# Patient Record
Sex: FEMALE
[De-identification: names, ages, dates, MRNs, and addresses within clinical notes are randomized per-mention and may not be internally consistent; named-entity substitution may affect disease eponyms.]

---

## 2018-07-02 ENCOUNTER — HOSPITAL ENCOUNTER (OUTPATIENT)
Dept: HOSPITAL 92 - L&D/OP | Age: 23
Discharge: HOME | End: 2018-07-02
Attending: OBSTETRICS & GYNECOLOGY
Payer: COMMERCIAL

## 2018-07-02 VITALS — TEMPERATURE: 98.9 F | DIASTOLIC BLOOD PRESSURE: 99 MMHG | SYSTOLIC BLOOD PRESSURE: 169 MMHG

## 2018-07-02 VITALS — BODY MASS INDEX: 42.8 KG/M2

## 2018-07-02 DIAGNOSIS — O99.89: Primary | ICD-10-CM

## 2018-07-02 DIAGNOSIS — R03.0: ICD-10-CM

## 2018-07-02 DIAGNOSIS — Z3A.33: ICD-10-CM

## 2018-07-02 LAB
ALBUMIN SERPL BCG-MCNC: 3.4 G/DL (ref 3.5–5)
ALP SERPL-CCNC: 122 U/L (ref 40–150)
ALT SERPL W P-5'-P-CCNC: 23 U/L (ref 8–55)
ANION GAP SERPL CALC-SCNC: 12 MMOL/L (ref 10–20)
AST SERPL-CCNC: 19 U/L (ref 5–34)
BASOPHILS # BLD AUTO: 0.1 THOU/UL (ref 0–0.2)
BASOPHILS NFR BLD AUTO: 0.7 % (ref 0–1)
BILIRUB SERPL-MCNC: 0.2 MG/DL (ref 0.2–1.2)
BUN SERPL-MCNC: 5 MG/DL (ref 7–18.7)
CALCIUM SERPL-MCNC: 9.7 MG/DL (ref 7.8–10.44)
CHLORIDE SERPL-SCNC: 106 MMOL/L (ref 98–107)
CO2 SERPL-SCNC: 23 MMOL/L (ref 22–29)
CREAT CL PREDICTED SERPL C-G-VRATE: 218 ML/MIN (ref 70–130)
EOSINOPHIL # BLD AUTO: 0.1 THOU/UL (ref 0–0.7)
EOSINOPHIL NFR BLD AUTO: 1.1 % (ref 0–10)
GLOBULIN SER CALC-MCNC: 3.4 G/DL (ref 2.4–3.5)
GLUCOSE SERPL-MCNC: 104 MG/DL (ref 70–105)
HGB BLD-MCNC: 12 G/DL (ref 12–16)
LYMPHOCYTES # BLD: 3 THOU/UL (ref 1.2–3.4)
LYMPHOCYTES NFR BLD AUTO: 27.7 % (ref 21–51)
MCH RBC QN AUTO: 29.6 PG (ref 27–31)
MCV RBC AUTO: 85.9 FL (ref 78–98)
MONOCYTES # BLD AUTO: 0.6 THOU/UL (ref 0.11–0.59)
MONOCYTES NFR BLD AUTO: 5.8 % (ref 0–10)
NEUTROPHILS # BLD AUTO: 7.1 THOU/UL (ref 1.4–6.5)
NEUTROPHILS NFR BLD AUTO: 64.8 % (ref 42–75)
PLATELET # BLD AUTO: 260 THOU/UL (ref 130–400)
POTASSIUM SERPL-SCNC: 4.4 MMOL/L (ref 3.5–5.1)
RBC # BLD AUTO: 4.05 MILL/UL (ref 4.2–5.4)
SODIUM SERPL-SCNC: 137 MMOL/L (ref 136–145)
SP GR UR STRIP: 1.01 (ref 1–1.04)
WBC # BLD AUTO: 10.9 THOU/UL (ref 4.8–10.8)

## 2018-07-02 PROCEDURE — 85025 COMPLETE CBC W/AUTO DIFF WBC: CPT

## 2018-07-02 PROCEDURE — 80053 COMPREHEN METABOLIC PANEL: CPT

## 2018-07-02 PROCEDURE — 81003 URINALYSIS AUTO W/O SCOPE: CPT

## 2018-07-02 PROCEDURE — 36415 COLL VENOUS BLD VENIPUNCTURE: CPT

## 2018-07-02 PROCEDURE — 99283 EMERGENCY DEPT VISIT LOW MDM: CPT

## 2018-07-02 NOTE — SS
OB TRIAGE NOTE

 

DATE OF EVALUATION:  07/02/2018

 

REGULAR PHYSICIAN:  Katy Washburn M.D.

 

EVALUATING PHYSICIAN:  Miguel Hernandez M.D.

 

CHIEF COMPLAINT:  Elevated blood pressures at home.

 

HISTORY OF PRESENT ILLNESS:  Ms. Otto is a 22-year-old , G1, P0, with an estimated date of c
onfinement of 08/19/2018, who presents complaining of swelling of her hands and feet over the last 24
 hours.  She denies visual changes, headaches or right upper quadrant pain.  She states that she had 
her father's blood pressure cuff at home and took her blood pressures there and they were elevated.  
Her prenatal care has been uncomplicated so far with Dr. Washburn.

 

PAST MEDICAL HISTORY:  Unremarkable.

 

PAST SURGICAL HISTORY:  None.

 

CURRENT MEDICATIONS:  Prenatal vitamins.

 

ALLERGIES:  No known allergies.

 

SOCIAL HISTORY:  Denies tobacco or alcohol use.

 

FAMILY HISTORY:  Denies gynecologic malignancy.

 

REVIEW OF SYSTEMS:  She denies headache, visual change, right upper quadrant pain, vaginal bleeding o
r leakage of fluid.

 

PHYSICAL EXAMINATION:

VITAL SIGNS:  Initial blood pressures here are 166/99 and 157/93.  It was repeated with a second cuff
 and was then 144/95.

ABDOMEN:  Soft, nontender and gravid.

PELVIC:  Pelvic examination is deferred.  Fetal heart tones are stable.  No decelerations are seen.  
No uterine contractions are seen.

 

LABORATORY DATA:  White count 10.9, hemoglobin and hematocrit 12.0 and 34.8, platelets are 260,000.  
Creatinine 0.6.  AST and ALT are 19 and 23 respectively.  On urinalysis specific gravity is 1.011, no
 protein, no glucose, no nitrites and no bilirubin are seen.

 

Blood pressures are repeated and most recent blood pressure is 138/84.

 

ASSESSMENT:

1.  A 33 and 1/7 week intrauterine pregnancy.

2.  No evidence of severe PIH at this time.

 

PLAN:  The patient will be discharged home to bed rest.  She is told to contact Dr. Washburn regarding
 her visit here today.  Signs and symptoms of preeclampsia have been reviewed with her in detail, and
 she should return to labor and delivery should she experience these.

## 2018-07-24 ENCOUNTER — HOSPITAL ENCOUNTER (INPATIENT)
Dept: HOSPITAL 92 - L&D/OP | Age: 23
LOS: 5 days | Discharge: HOME | End: 2018-07-29
Attending: OBSTETRICS & GYNECOLOGY | Admitting: OBSTETRICS & GYNECOLOGY
Payer: COMMERCIAL

## 2018-07-24 VITALS — BODY MASS INDEX: 43.4 KG/M2

## 2018-07-24 DIAGNOSIS — O61.0: ICD-10-CM

## 2018-07-24 DIAGNOSIS — Z3A.36: ICD-10-CM

## 2018-07-24 LAB
ALBUMIN SERPL BCG-MCNC: 3.4 G/DL (ref 3.5–5)
ALP SERPL-CCNC: 172 U/L (ref 40–150)
ALT SERPL W P-5'-P-CCNC: 26 U/L (ref 8–55)
ANION GAP SERPL CALC-SCNC: 12 MMOL/L (ref 10–20)
AST SERPL-CCNC: 23 U/L (ref 5–34)
BACTERIA UR QL AUTO: (no result) HPF
BASOPHILS # BLD AUTO: 0 THOU/UL (ref 0–0.2)
BASOPHILS NFR BLD AUTO: 0.2 % (ref 0–1)
BILIRUB SERPL-MCNC: 0.2 MG/DL (ref 0.2–1.2)
BUN SERPL-MCNC: 10 MG/DL (ref 7–18.7)
CALCIUM SERPL-MCNC: 8.9 MG/DL (ref 7.8–10.44)
CHLORIDE SERPL-SCNC: 106 MMOL/L (ref 98–107)
CO2 SERPL-SCNC: 20 MMOL/L (ref 22–29)
CREAT CL PREDICTED SERPL C-G-VRATE: 201 ML/MIN (ref 70–130)
CRYSTAL-AUWI FLAG: 0 (ref 0–15)
EOSINOPHIL # BLD AUTO: 0.1 THOU/UL (ref 0–0.7)
EOSINOPHIL NFR BLD AUTO: 0.8 % (ref 0–10)
GLOBULIN SER CALC-MCNC: 3.3 G/DL (ref 2.4–3.5)
GLUCOSE SERPL-MCNC: 85 MG/DL (ref 70–105)
HEV IGM SER QL: 4.6 (ref 0–7.99)
HGB BLD-MCNC: 12.1 G/DL (ref 12–16)
HYALINE CASTS #/AREA URNS LPF: (no result) LPF
LYMPHOCYTES # BLD: 3.2 THOU/UL (ref 1.2–3.4)
LYMPHOCYTES NFR BLD AUTO: 27.7 % (ref 21–51)
MCH RBC QN AUTO: 30.7 PG (ref 27–31)
MCV RBC AUTO: 85.7 FL (ref 78–98)
MONOCYTES # BLD AUTO: 0.7 THOU/UL (ref 0.11–0.59)
MONOCYTES NFR BLD AUTO: 6 % (ref 0–10)
NEUTROPHILS # BLD AUTO: 7.6 THOU/UL (ref 1.4–6.5)
NEUTROPHILS NFR BLD AUTO: 65.3 % (ref 42–75)
PATHC CAST-AUWI FLAG: 0.29 (ref 0–2.49)
PLATELET # BLD AUTO: 228 THOU/UL (ref 130–400)
POTASSIUM SERPL-SCNC: 3.9 MMOL/L (ref 3.5–5.1)
RBC # BLD AUTO: 3.93 MILL/UL (ref 4.2–5.4)
RBC UR QL AUTO: (no result) HPF (ref 0–3)
SODIUM SERPL-SCNC: 134 MMOL/L (ref 136–145)
SP GR UR STRIP: 1.01 (ref 1–1.04)
SPERM-AUWI FLAG: 0 (ref 0–9.9)
WBC # BLD AUTO: 11.6 THOU/UL (ref 4.8–10.8)
YEAST-AUWI FLAG: 0 (ref 0–25)

## 2018-07-24 PROCEDURE — 76815 OB US LIMITED FETUS(S): CPT

## 2018-07-24 PROCEDURE — 51702 INSERT TEMP BLADDER CATH: CPT

## 2018-07-24 PROCEDURE — 86901 BLOOD TYPING SEROLOGIC RH(D): CPT

## 2018-07-24 PROCEDURE — 36415 COLL VENOUS BLD VENIPUNCTURE: CPT

## 2018-07-24 PROCEDURE — 85027 COMPLETE CBC AUTOMATED: CPT

## 2018-07-24 PROCEDURE — 81003 URINALYSIS AUTO W/O SCOPE: CPT

## 2018-07-24 PROCEDURE — 86900 BLOOD TYPING SEROLOGIC ABO: CPT

## 2018-07-24 PROCEDURE — 86780 TREPONEMA PALLIDUM: CPT

## 2018-07-24 PROCEDURE — 87340 HEPATITIS B SURFACE AG IA: CPT

## 2018-07-24 PROCEDURE — 80053 COMPREHEN METABOLIC PANEL: CPT

## 2018-07-24 PROCEDURE — 99285 EMERGENCY DEPT VISIT HI MDM: CPT

## 2018-07-24 PROCEDURE — 86850 RBC ANTIBODY SCREEN: CPT

## 2018-07-24 PROCEDURE — 85025 COMPLETE CBC W/AUTO DIFF WBC: CPT

## 2018-07-24 PROCEDURE — 82805 BLOOD GASES W/O2 SATURATION: CPT

## 2018-07-24 PROCEDURE — 81015 MICROSCOPIC EXAM OF URINE: CPT

## 2018-07-24 RX ADMIN — MAGNESIUM SULFATE HEPTAHYDRATE SCH MLS: 40 INJECTION, SOLUTION INTRAVENOUS at 23:40

## 2018-07-24 NOTE — PDOC.LDHP
Labor and Delivery H&P


Chief complaint: other (c/o pressure)


HPI: 


23 yo LAF presents c/o vaginal pressure. Denies HA or visual changes.


Current gestational age (weeks): 36


Due date: 18


Dating criteria: last menstrual period


Grav: 1


Para: 0


OB History Details: 


PNC with Dr. Washburn.


Seen several weeks ago with elevated BP, labs were normal at that time.


Current pregnancy complications: none


Abnormal US findings: No


Past Medical History: 


None


Current medications: pre-milka vitamins


Previous surgical history: none


Allergies/Adverse Reactions: 


 Allergies











Allergy/AdvReac Type Severity Reaction Status Date / Time


 


No Known Drug Allergies Allergy   Verified 18 01:22











Social history: none





- Physical Exam


Abnormal vital signs: /106


General: NAD


Heart: RRR


Lungs: nonlabored breathing


Abdomen: gravid


Extremeties: pitting edema


FHT: category 1, variability present





- Vaginal Exam


cm dilated: 1


Effacement: 0%


Station: -2





- OB Labs


Blood type: unknown


RH: unknown


Antibody Screen: unknown


HIV: unknown


RPR: unknown


HEPSAg: unknown


1 hour GCT: unknown


GBS: unknown





- Assessment


36 and 2/7 weeks


BPs c/w severe PIH


Bedside USG confirms vtx, subjective decreased CHANDU and grade 3 placenta


Labs WNL


Unfavorable cervix





- Plan


-: 


Admit to L&D.


Start MgSO4 and observe.


Give steroids x2, 12 hrs apart.


Begin Cytotec for cervical ripening followed by Pitocin induction to follow.


Discussed with Dr. Washburn who agrees with plan.

## 2018-07-25 LAB
ANALYZER IN CARDIO: (no result)
BASE EXCESS STD BLDA CALC-SCNC: -11 MEQ/L
HBSAG INDEX: 0.22 S/CO (ref 0–0.99)
HCO3 BLDA-SCNC: 19 MEQ/L (ref 22–28)
SYPHILIS ANTIBODY INDEX: 0.05 S/CO

## 2018-07-25 RX ADMIN — MAGNESIUM SULFATE HEPTAHYDRATE SCH MLS: 40 INJECTION, SOLUTION INTRAVENOUS at 22:06

## 2018-07-25 RX ADMIN — MAGNESIUM SULFATE HEPTAHYDRATE SCH MLS: 40 INJECTION, SOLUTION INTRAVENOUS at 08:23

## 2018-07-25 NOTE — PDOC.LDPN
Labor & Delivery Progress Note





- Subjective


Subjective: comfortable





- Objective


Vital signs reviewed and normal: yes


General: NAD


Uterine fundus: non tender


Dilation: 4


Effacement: 90%


Station: -2


FHT: category 2, late decelerations (recurrent with > 50% of contractions, deep 

)


[place] contractions every: 2-5min





- Assessment


(1) 36 weeks gestation of pregnancy


Code(s): Z3A.36 - 36 WEEKS GESTATION OF PREGNANCY   Current Visit: Yes   Status

: Acute   





(2) Severe preeclampsia


Code(s): O14.10 - SEVERE PRE-ECLAMPSIA, UNSPECIFIED TRIMESTER   Current Visit: 

Yes   Status: Acute   


Qualifiers: 


   Trimester: third trimester   Qualified Code(s): O14.13 - Severe pre-eclampsia

, third trimester   





(3) Non-reassuring electronic fetal monitoring tracing


Code(s): O76 - ABNLT IN FETAL HEART RATE AND RHYTHM COMP LABOR AND DELIVERY   

Current Visit: Yes   Status: Acute   


-: 





(late entry) At 1400 I was called by JILLIAN Ritchie for NRFHT, prev approx 20min 

earlier had just given 2nd dose of terbutaline for recurrent late decels and 

minimal variability, pt had received epidural that was working and maternal O2, 

IVF bolus and position change.  Late decelerations were getting worse and 

decision was made to proceed to OR with stat primary CS for NRFHT.

## 2018-07-25 NOTE — PDOC.LDPN
Labor & Delivery Progress Note





- Subjective


Subjective: painful contractions (8/10), other (no PIH sx.)





- Objective


Vital signs reviewed and normal: yes


Abnormal vital signs: mild range blood pressures


General: NAD


Uterine fundus: non tender


Dilation: 2


Effacement: 75%


Station: -2 (arom clear)


FHT: category 2, late decelerations


[place] contractions every: 3min


AROM: clear fluid


IUPC placed: yes


FSE placed: yes


Resuscitative measures: maternal oxygen, maternal IV fluids, maternal position 

change





- Assessment


(1) 36 weeks gestation of pregnancy


Code(s): Z3A.36 - 36 WEEKS GESTATION OF PREGNANCY   Current Visit: Yes   Status

: Acute   





(2) Severe preeclampsia


Code(s): O14.10 - SEVERE PRE-ECLAMPSIA, UNSPECIFIED TRIMESTER   Current Visit: 

Yes   Status: Acute   


Plan: pitocin for augmentation, resuscitative measures, other (cont mag for PEC 

and PCN for GBS, s/p BMZ x 2 doses.  Plan of care discussed with pt and family.)

## 2018-07-25 NOTE — PRG
DATE OF SERVICE:  2018

 

OB ER ENCOUNTER

 

The patient is a 22-year-old female who underwent a primary  for nonreassuring fetal heart t
ones.  Dr. Katy Washburn was primary surgeon and Dr. Kale Black, myself was first alton fernandez

## 2018-07-25 NOTE — PDOC.OPDEL
OB Operative/Delivery Note


Delivery Dr/Surgeon: Maddison


Assist: Sofia


Pre-Delivery Diagnosis: non-reassuring fetal tracing (, Severe PIH, 36wk 

gestation)


Procedure/Post Delivery Dx: primary low transverse CS (stat)


Weeks gestation: 36


Anesthesia: epidural





- Findings


  ** A


Sex: male


Weight: 6 lb 2 oz





- Additional Findings/Plan


Placenta delivered: spontaneous


 findings: low transverse hysterotomy without extension, normal uterus, 

normal tubes, normal ovaries


Estimated blood loss: normal, final pending


Post delivery plan: recovery in LICU (mag recovery)

## 2018-07-25 NOTE — OP
DATE OF OPERATION:  2018

 

PREOPERATIVE DIAGNOSES:

1.  Intrauterine pregnancy at 36 weeks and 3 days.

2.  Severe preeclampsia.

3.  Non-reassuring fetal heart tones.

 

POSTOPERATIVE DIAGNOSES:

1.  Intrauterine pregnancy at 36 weeks and 3 days.

2.  Severe preeclampsia.

3.  Non-reassuring fetal heart tones.

 

PROCEDURE:  Primary low transverse  section via Pfannenstiel skin incision.

 

ANESTHESIA:  Epidural.

 

ATTENDING SURGEON:  Katy Washburn M.D.

 

ASSISTANT:  Dr. Adam Black.

 

ESTIMATED BLOOD LOSS:  Minimal.  Final quantitative blood loss is pending.

 

INTRAVENOUS FLUIDS:  1100 mL crystalloid.

 

URINE OUTPUT:  42 of clear urine.

 

COMPLICATIONS:  None.

 

DRAINS:  Badillo catheter.

 

PATHOLOGY:  None.

 

FINDINGS:  Male infant, cephalic presentation, clear amniotic fluid, Apgars are pending, weight was 6
 pounds 2 ounces, body cord x1.  Normal uterus, ovaries and tubes bilaterally.  Hysterotomy without e
xtension.

 

OPERATIVE INDICATIONS:  A 22-year-old G1, P0 at 36 weeks and 2 days, presented with pelvic pressure w
as found to have severe range pressures, had workup for preeclampsia that included normal labs and no
 symptoms of preeclampsia.  She was dispositioned for induction secondary to severe features met by haroldo worley of blood pressure.  She was started on magnesium for persistent severe range pressures and gi
roberto 2 doses of Cytotec overnight approximately 3 hours.  After the second dose of Cytotec, the patien
t began having intermittent late deceleration.  She was resuscitated and this improved.  She had mode
rate variability and occasional acceleration.  She was ruptured and internals were placed for improve
d monitoring and the patient was marcy on her own every 2-3 minutes.  Shortly thereafter, the p
atient began having recurrent repetitive late decelerations with every contraction and she was resusc
itated and given a dose of terbutaline.  She then requested an epidural, which she was able to get, t
he fetal heart tones improved after the epidural and she began having late decelerations once again. 
 She was bolused.  Her blood pressure did not drop secondary to the epidural and was given a second d
ose of terbutaline.  This did not help her late deceleration and they began to come deeper with minim
al variability and decision at that time was made to proceed for staph .

 

OPERATIVE TECHNIQUE:  The patient was taken to the operating room where epidural anesthesia was found
 to be adequate.  The patient was prepped and draped in a sterile fashion in the dorsal supine positi
on with a leftward tilt.  After ensuring adequacy of anesthesia, a Pfannenstiel skin incision was mad
e and carried down to the underlying subcutaneous tissue with knife.  The fascia was nicked in the mi
dline with a knife and carried laterally with the Marquez scissors.  The superior aspect of the fascia w
as tented with 2 Kochers and dissected off the rectus with the Marquez scissors.  The inferior aspect of
 the fascia was tented with 2 Kochers and dissected off the rectus down the pubic symphysis.  The rec
tus were bluntly divided in midline.  The peritoneum was bluntly entered into and manually retracted.
  The Toño O retractor was placed in the lower uterine segment was incised in a transverse fashion 
and extended with the Whitaker maneuver.  The infant's head was brought to the hysterotomy and delivered 
with fundal pressure, the delayed cord clamping was performed and the infant's cord was clamped and i
nfant handed to awaiting serene team.  The placenta was allowed to spontaneously deliver.  The uterus wa
s exteriorized, cleared of all clots and debris and the posterior cul-de-sac was lapped out uterus.  
Placed back into the abdomen and hysterotomy was repaired with a #1 Monocryl in a running locking fas
hion.  A second horizontal imbricating layer was placed and excellent hemostasis was noted.  Irrigati
on was performed of the pelvis and suction.  The Toño O retractor was removed and the rectus muscle
s were examined and noted to be hemostatic.  The peritoneum was closed with a 2-0 chromic in a runnin
g fashion.  The fascia was repaired with #1 PDS x2 sutures with excellent reapproximation.  The subcu
taneous tissue was irrigated and cauterized of any bleeders and reapproximated with 2-0 plain gut in 
a running fashion.  The skin was closed with 4-0 Monocryl in a subcuticular fashion.  Dermabond was a
pplied.  The patient tolerated procedure well.  Sponge, lap, needle count correct x2.  The patient wa
s taken to recovery in stable condition.  Patient received Ancef 2 grams prior to procedure.

## 2018-07-25 NOTE — PDOC.EVN
Event Note





- Event Note


Event Note: 


BPs improved on MgSO4.


1st dose Ctotec and steroids given.


Stadol given per pt. request.


FHTs stable, irregular UCs seen.


Cont. present mgmt.

## 2018-07-26 RX ADMIN — MAGNESIUM SULFATE HEPTAHYDRATE SCH MLS: 40 INJECTION, SOLUTION INTRAVENOUS at 08:56

## 2018-07-26 RX ADMIN — HYDROCODONE BITARTRATE AND ACETAMINOPHEN PRN TAB: 5; 325 TABLET ORAL at 17:05

## 2018-07-26 RX ADMIN — DOCUSATE CALCIUM SCH: 240 CAPSULE, LIQUID FILLED ORAL at 17:17

## 2018-07-26 RX ADMIN — HYDROCODONE BITARTRATE AND ACETAMINOPHEN PRN TAB: 5; 325 TABLET ORAL at 08:45

## 2018-07-26 NOTE — PDOC.PP
Post Partum Progress Note


Post Partum Day #: 1


PO intake tolerated: yes


Flatus: no


Ambulation: no


 Vital Signs (12 hours)











  Temp Pulse Resp


 


 07/26/18 04:00  98.3 F  96  20








 Weight











Weight                         215 lb

















- Physical Examination


General: NAD


Cardiovascular: RRR


Respiratory: non-labored breathing


Abdominal: no distention, appropriately TTP


Fundus firm & at: umb


Extremities: negative homans (B)


Neurological: no gross focal deficits


Psychiatric: normal affect


Result Diagrams: 


 07/24/18 20:47





 07/24/18 20:47


Additional Labs: 


 Post Partum Labs











Blood Type  AB POSITIVE   07/24/18  23:48    


 


Hep Bs Antigen  Non-Reactive S/CO (NonReactive)   07/24/18  23:48    











(1) 36 weeks gestation of pregnancy


Code(s): Z3A.36 - 36 WEEKS GESTATION OF PREGNANCY   Status: Acute   





(2) Severe preeclampsia


Code(s): O14.10 - SEVERE PRE-ECLAMPSIA, UNSPECIFIED TRIMESTER   Status: Acute   


Qualifiers: 


   Trimester: third trimester   Qualified Code(s): O14.13 - Severe pre-eclampsia

, third trimester   





(3) Non-reassuring electronic fetal monitoring tracing


Code(s): O76 - ABNLT IN FETAL HEART RATE AND RHYTHM COMP LABOR AND DELIVERY   

Status: Acute

## 2018-07-27 LAB
HGB BLD-MCNC: 10.9 G/DL (ref 12–16)
MCH RBC QN AUTO: 29.4 PG (ref 27–31)
MCV RBC AUTO: 87.3 FL (ref 78–98)
PLATELET # BLD AUTO: 212 THOU/UL (ref 130–400)
RBC # BLD AUTO: 3.7 MILL/UL (ref 4.2–5.4)
WBC # BLD AUTO: 15.6 THOU/UL (ref 4.8–10.8)

## 2018-07-27 RX ADMIN — HYDROCODONE BITARTRATE AND ACETAMINOPHEN PRN TAB: 5; 325 TABLET ORAL at 18:27

## 2018-07-27 RX ADMIN — HYDROCODONE BITARTRATE AND ACETAMINOPHEN PRN TAB: 5; 325 TABLET ORAL at 23:02

## 2018-07-27 RX ADMIN — HYDROCODONE BITARTRATE AND ACETAMINOPHEN PRN TAB: 5; 325 TABLET ORAL at 06:36

## 2018-07-27 RX ADMIN — HYDROCODONE BITARTRATE AND ACETAMINOPHEN PRN TAB: 5; 325 TABLET ORAL at 11:22

## 2018-07-27 RX ADMIN — DOCUSATE CALCIUM SCH MG: 240 CAPSULE, LIQUID FILLED ORAL at 00:48

## 2018-07-27 RX ADMIN — DOCUSATE CALCIUM SCH MG: 240 CAPSULE, LIQUID FILLED ORAL at 07:07

## 2018-07-27 RX ADMIN — DOCUSATE CALCIUM SCH MG: 240 CAPSULE, LIQUID FILLED ORAL at 21:49

## 2018-07-27 RX ADMIN — HYDROCODONE BITARTRATE AND ACETAMINOPHEN PRN TAB: 5; 325 TABLET ORAL at 14:15

## 2018-07-27 RX ADMIN — HYDROCODONE BITARTRATE AND ACETAMINOPHEN PRN TAB: 5; 325 TABLET ORAL at 00:48

## 2018-07-27 NOTE — PDOC.PP
Post Partum Progress Note


Post Partum Day #: 2


PO intake tolerated: yes


Flatus: yes


Ambulation: yes


 Vital Signs (12 hours)











  Temp Pulse Resp BP


 


 07/27/18 00:00  98.0 F  92  20  141/85 H


 


 07/26/18 21:20  98.5 F  99  18  136/77








 Weight











Weight                         215 lb

















- Physical Examination


General: NAD


Cardiovascular: RRR


Respiratory: non-labored breathing


Abdominal: no distention, appropriately TTP


Fundus firm & at: umb


Extremities: negative homans (B)


Skin: CS incision dry & intact


Neurological: no gross focal deficits


Psychiatric: normal affect


Result Diagrams: 


 07/27/18 05:24





 07/24/18 20:47


Additional Labs: 


 Post Partum Labs











Blood Type  AB POSITIVE   07/24/18  23:48    


 


Hep Bs Antigen  Non-Reactive S/CO (NonReactive)   07/24/18  23:48    











(1) 36 weeks gestation of pregnancy


Code(s): Z3A.36 - 36 WEEKS GESTATION OF PREGNANCY   Status: Acute   





(2) Severe preeclampsia


Code(s): O14.10 - SEVERE PRE-ECLAMPSIA, UNSPECIFIED TRIMESTER   Status: Acute   


Qualifiers: 


   Trimester: third trimester   Qualified Code(s): O14.13 - Severe pre-eclampsia

, third trimester   





(3) Non-reassuring electronic fetal monitoring tracing


Code(s): O76 - ABNLT IN FETAL HEART RATE AND RHYTHM COMP LABOR AND DELIVERY   

Status: Acute   





- Assessment/Plan





POD2 s/p PCS for NRFHT at 36w 2/2 severe PIH


VSSAF


PEC- s/p mag, BP nl-mild, no sx PIH, nl labs, Will start BP meds if pressures 

persistently elevated


Mild asx anemia d/t surgical blood loss, cont PNV


Appropriate postop milestones, 


Breastpumping, infant in NICU


Rh pos RImm


Cont postop care, poss home tomorrow

## 2018-07-28 LAB
HGB BLD-MCNC: 11.4 G/DL (ref 12–16)
MCH RBC QN AUTO: 30.5 PG (ref 27–31)
MCV RBC AUTO: 88.5 FL (ref 78–98)
PLATELET # BLD AUTO: 211 THOU/UL (ref 130–400)
RBC # BLD AUTO: 3.73 MILL/UL (ref 4.2–5.4)
WBC # BLD AUTO: 16.2 THOU/UL (ref 4.8–10.8)

## 2018-07-28 RX ADMIN — DOCUSATE CALCIUM SCH MG: 240 CAPSULE, LIQUID FILLED ORAL at 08:37

## 2018-07-28 RX ADMIN — HYDROCODONE BITARTRATE AND ACETAMINOPHEN PRN TAB: 5; 325 TABLET ORAL at 17:48

## 2018-07-28 RX ADMIN — HYDROCODONE BITARTRATE AND ACETAMINOPHEN PRN TAB: 5; 325 TABLET ORAL at 22:09

## 2018-07-28 RX ADMIN — HYDROCODONE BITARTRATE AND ACETAMINOPHEN PRN TAB: 5; 325 TABLET ORAL at 08:37

## 2018-07-28 RX ADMIN — HYDROCODONE BITARTRATE AND ACETAMINOPHEN PRN TAB: 5; 325 TABLET ORAL at 13:16

## 2018-07-28 RX ADMIN — DOCUSATE CALCIUM SCH MG: 240 CAPSULE, LIQUID FILLED ORAL at 22:06

## 2018-07-28 NOTE — PRG
DATE OF SERVICE:  2018

 

PRIMARY OB:  Dr. Katy Washburn.

 

HISTORY OF PRESENT ILLNESS:  The patient is postop day #3, status post a primary  for non-re
assuring fetal heart tones in the setting of preeclampsia with severe features.  The patient's postop
erative course has been complicated by severe range blood pressures in the last 24 hours, for which h
er primary OB has instituted single dose of labetalol 100 mg.  The patient, otherwise, reports she is
 tolerating p.o., voiding on her own, having decreased lochia and good pain control.  She denies head
aches, chest pain, shortness of breath.

 

OBJECTIVE:

MOST RECENT VITAL SIGNS:  Blood pressure is 162/98, pulse of 107, temperature 98.3, respiratory rate 
of 20.

GENERAL:  She appears to be in no acute distress.  She is alert, oriented, cooperative, and pleasant 
to interact with.

HEAD:  Normocephalic, atraumatic.

LUNGS:  Clear to auscultation bilaterally.

ABDOMEN:  Her fundus is firm.  Incision is clean, dry, and intact.

 

LABORATORY STUDIES:  This morning, she has a white count of 16.2, hemoglobin of 11.4, hematocrit 33.0
, platelets of 211,000.

 

ASSESSMENT AND PLAN:  The patient is a 22-year-old female postoperative day #3, status post primary C
-section, who is recovering well from that standpoint; however, her severe range blood pressures over
 the last 24 hours will necessitate her continued care here in the hospital.  I will place her on joe
eduled labetalol 200 mg twice a day and see if her blood pressures will be controlled in the next 24 
hours.

## 2018-07-29 VITALS — TEMPERATURE: 98 F

## 2018-07-29 VITALS — SYSTOLIC BLOOD PRESSURE: 162 MMHG | DIASTOLIC BLOOD PRESSURE: 85 MMHG

## 2018-07-29 RX ADMIN — HYDROCODONE BITARTRATE AND ACETAMINOPHEN PRN TAB: 5; 325 TABLET ORAL at 08:44

## 2018-07-29 RX ADMIN — DOCUSATE CALCIUM SCH MG: 240 CAPSULE, LIQUID FILLED ORAL at 08:44
